# Patient Record
Sex: MALE | NOT HISPANIC OR LATINO
[De-identification: names, ages, dates, MRNs, and addresses within clinical notes are randomized per-mention and may not be internally consistent; named-entity substitution may affect disease eponyms.]

---

## 2019-03-17 PROBLEM — Z00.00 ENCOUNTER FOR PREVENTIVE HEALTH EXAMINATION: Status: ACTIVE | Noted: 2019-03-17

## 2019-04-18 ENCOUNTER — APPOINTMENT (OUTPATIENT)
Dept: CARDIOLOGY | Facility: CLINIC | Age: 74
End: 2019-04-18
Payer: MEDICARE

## 2019-04-18 VITALS — SYSTOLIC BLOOD PRESSURE: 160 MMHG | DIASTOLIC BLOOD PRESSURE: 80 MMHG

## 2019-04-18 VITALS
HEIGHT: 67 IN | WEIGHT: 159 LBS | BODY MASS INDEX: 24.96 KG/M2 | HEART RATE: 55 BPM | RESPIRATION RATE: 16 BRPM | OXYGEN SATURATION: 98 %

## 2019-04-18 VITALS — SYSTOLIC BLOOD PRESSURE: 148 MMHG | DIASTOLIC BLOOD PRESSURE: 82 MMHG

## 2019-04-18 DIAGNOSIS — Z82.49 FAMILY HISTORY OF ISCHEMIC HEART DISEASE AND OTHER DISEASES OF THE CIRCULATORY SYSTEM: ICD-10-CM

## 2019-04-18 DIAGNOSIS — Z87.891 PERSONAL HISTORY OF NICOTINE DEPENDENCE: ICD-10-CM

## 2019-04-18 DIAGNOSIS — Z80.6 FAMILY HISTORY OF LEUKEMIA: ICD-10-CM

## 2019-04-18 DIAGNOSIS — Z83.3 FAMILY HISTORY OF DIABETES MELLITUS: ICD-10-CM

## 2019-04-18 PROCEDURE — 99204 OFFICE O/P NEW MOD 45 MIN: CPT

## 2019-04-18 PROCEDURE — 93000 ELECTROCARDIOGRAM COMPLETE: CPT

## 2019-04-19 PROBLEM — Z83.3 FAMILY HISTORY OF DIABETES MELLITUS: Status: ACTIVE | Noted: 2019-04-19

## 2019-04-19 PROBLEM — Z87.891 FORMER SMOKER: Status: ACTIVE | Noted: 2019-04-19

## 2019-04-19 PROBLEM — Z80.6 FAMILY HISTORY OF LEUKEMIA: Status: ACTIVE | Noted: 2019-04-19

## 2019-04-19 PROBLEM — Z82.49 FAMILY HISTORY OF HYPERTENSION: Status: ACTIVE | Noted: 2019-04-19

## 2019-04-19 NOTE — ASSESSMENT
[FreeTextEntry1] : The findings noted in his EKG are a normal variant.  They're not abnormal.  Blood pressure is uncontrolled.  Diabetes control could be better.  Hemoglobin A1c is about 7.0.  He has nonobstructive coronary disease.  It is quite possible that this disease has progress over the last 16 years.  He also has history of hyperlipidemia.

## 2019-04-19 NOTE — PHYSICAL EXAM
[General Appearance - Well Developed] : well developed [Normal Appearance] : normal appearance [General Appearance - Well Nourished] : well nourished [Well Groomed] : well groomed [No Deformities] : no deformities [General Appearance - In No Acute Distress] : no acute distress [Normal Conjunctiva] : the conjunctiva exhibited no abnormalities [Normal Oral Mucosa] : normal oral mucosa [Eyelids - No Xanthelasma] : the eyelids demonstrated no xanthelasmas [No Oral Pallor] : no oral pallor [No Oral Cyanosis] : no oral cyanosis [Normal Jugular Venous V Waves Present] : normal jugular venous V waves present [Normal Jugular Venous A Waves Present] : normal jugular venous A waves present [No Jugular Venous Strickland A Waves] : no jugular venous strickland A waves [Heart Rate And Rhythm] : heart rate and rhythm were normal [Heart Sounds] : normal S1 and S2 [Murmurs] : no murmurs present [Respiration, Rhythm And Depth] : normal respiratory rhythm and effort [Exaggerated Use Of Accessory Muscles For Inspiration] : no accessory muscle use [Auscultation Breath Sounds / Voice Sounds] : lungs were clear to auscultation bilaterally [Abdomen Tenderness] : non-tender [Abdomen Soft] : soft [Abdomen Mass (___ Cm)] : no abdominal mass palpated [Abnormal Walk] : normal gait [Gait - Sufficient For Exercise Testing] : the gait was sufficient for exercise testing [Nail Clubbing] : no clubbing of the fingernails [Cyanosis, Localized] : no localized cyanosis [Petechial Hemorrhages (___cm)] : no petechial hemorrhages [Skin Color & Pigmentation] : normal skin color and pigmentation [] : no rash [No Venous Stasis] : no venous stasis [Skin Lesions] : no skin lesions [No Skin Ulcers] : no skin ulcer [No Xanthoma] : no  xanthoma was observed [Oriented To Time, Place, And Person] : oriented to person, place, and time [Affect] : the affect was normal [Mood] : the mood was normal [No Anxiety] : not feeling anxious

## 2019-04-19 NOTE — HISTORY OF PRESENT ILLNESS
[FreeTextEntry1] : 73 year old man of [place] descent from [facility] was referred by Dr. Mars, because of abnormal EKG.\par \par He denied any chest pain, palpitation, shortness of breath or dizziness.  He claimed that his blood pressure and blood sugar are under control.\par \par He works out regularly in the gym without any problems.  Occasionally he gets sticking chest pain lasting seconds.  It is unrelated to physical exertion.  There are no associated symptoms.\par \par I had seen him in 2003.  At that time he had cardiac catheterization because of abnormal stress test.  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  Medical therapy was recommended.  Since then he has not seen any cardiologist.

## 2019-04-19 NOTE — DISCUSSION/SUMMARY
[FreeTextEntry1] : Owing to his risk factors of hypertension, diabetes and hyperlipidemia and also because he had documented nonobstructive coronary disease 16 years ago, I would recommend further ischemia evaluation.  He has not been following regularly with cardiologist.  His lipid profile and A1c should be repeated. Based upon the results further recommendations will follow. \par \par  I also ordered echocardiogram to evaluate left ventricular size and systolic function and to rule out any regional wall motion abnormalities. Knowledge regarding the above would be extremely helpful in his continued treatment.\par \par I recommended changing simvastatin dose to 40 mg at bedtime and when it is finished he should be switched over to atorvastatin 20 mg at bedtime and titrated to get LDL cholesterol below 70.  I also increased lisinopril to 10 mg daily to get better control of blood pressure.  To verify the accuracy of his digital machine, I suggested that he bring it to the doctor's office and get its accuracy verified.\par \par

## 2019-05-09 ENCOUNTER — APPOINTMENT (OUTPATIENT)
Dept: CARDIOLOGY | Facility: CLINIC | Age: 74
End: 2019-05-09
Payer: MEDICARE

## 2019-05-09 PROCEDURE — 93016 CV STRESS TEST SUPVJ ONLY: CPT

## 2019-05-09 PROCEDURE — 93018 CV STRESS TEST I&R ONLY: CPT

## 2019-05-09 PROCEDURE — 78452 HT MUSCLE IMAGE SPECT MULT: CPT | Mod: 26

## 2019-05-23 ENCOUNTER — APPOINTMENT (OUTPATIENT)
Dept: CARDIOLOGY | Facility: CLINIC | Age: 74
End: 2019-05-23
Payer: MEDICARE

## 2019-05-23 VITALS — HEART RATE: 57 BPM | HEIGHT: 67 IN | WEIGHT: 160 LBS | BODY MASS INDEX: 25.11 KG/M2 | OXYGEN SATURATION: 58 %

## 2019-05-23 VITALS — DIASTOLIC BLOOD PRESSURE: 80 MMHG | SYSTOLIC BLOOD PRESSURE: 140 MMHG

## 2019-05-23 DIAGNOSIS — E11.37X2 TYPE 2 DIABETES MELLITUS WITH DIABETIC MACULAR EDEMA, RESOLVED FOLLOWING TREATMENT, LEFT EYE: ICD-10-CM

## 2019-05-23 DIAGNOSIS — E78.5 HYPERLIPIDEMIA, UNSPECIFIED: ICD-10-CM

## 2019-05-23 DIAGNOSIS — I25.10 ATHEROSCLEROTIC HEART DISEASE OF NATIVE CORONARY ARTERY W/OUT ANGINA PECTORIS: ICD-10-CM

## 2019-05-23 DIAGNOSIS — I10 ESSENTIAL (PRIMARY) HYPERTENSION: ICD-10-CM

## 2019-05-23 DIAGNOSIS — R94.31 ABNORMAL ELECTROCARDIOGRAM [ECG] [EKG]: ICD-10-CM

## 2019-05-23 PROCEDURE — 99214 OFFICE O/P EST MOD 30 MIN: CPT

## 2019-05-23 RX ORDER — SIMVASTATIN 40 MG/1
40 TABLET, FILM COATED ORAL DAILY
Refills: 0 | Status: COMPLETED | COMMUNITY
Start: 1900-01-01 | End: 2019-05-23

## 2019-05-23 RX ORDER — LISINOPRIL 10 MG/1
10 TABLET ORAL DAILY
Qty: 90 | Refills: 1 | Status: ACTIVE | COMMUNITY
Start: 2019-05-23 | End: 1900-01-01

## 2019-05-23 RX ORDER — ATORVASTATIN CALCIUM 20 MG/1
20 TABLET, FILM COATED ORAL
Qty: 90 | Refills: 1 | Status: ACTIVE | COMMUNITY
Start: 2019-05-23 | End: 1900-01-01

## 2019-05-23 NOTE — ASSESSMENT
[FreeTextEntry1] : Cardiac wise he is stable.  There is no evidence of significant coronary disease.  She also echo report.  His blood pressure should be a little better.\par \par I had recommended that he increase lisinopril to 10 mg and also to increase simvastatin to 40 mg daily and to switch subsequently to atorvastatin.  He misunderstood the directions and thought that he had to do that only until the stress test.  After the stress test he went back to lisinopril 2.5 mg daily and simvastatin 20 mg daily and then got a new prescription for simvastatin.

## 2019-05-23 NOTE — PHYSICAL EXAM
[General Appearance - Well Developed] : well developed [Normal Appearance] : normal appearance [Well Groomed] : well groomed [General Appearance - Well Nourished] : well nourished [No Deformities] : no deformities [General Appearance - In No Acute Distress] : no acute distress [Normal Conjunctiva] : the conjunctiva exhibited no abnormalities [Eyelids - No Xanthelasma] : the eyelids demonstrated no xanthelasmas [Normal Oral Mucosa] : normal oral mucosa [No Oral Pallor] : no oral pallor [No Oral Cyanosis] : no oral cyanosis [Normal Jugular Venous A Waves Present] : normal jugular venous A waves present [Normal Jugular Venous V Waves Present] : normal jugular venous V waves present [No Jugular Venous Strickland A Waves] : no jugular venous strickland A waves [Respiration, Rhythm And Depth] : normal respiratory rhythm and effort [Exaggerated Use Of Accessory Muscles For Inspiration] : no accessory muscle use [Auscultation Breath Sounds / Voice Sounds] : lungs were clear to auscultation bilaterally [Heart Rate And Rhythm] : heart rate and rhythm were normal [Heart Sounds] : normal S1 and S2 [Murmurs] : no murmurs present [Abdomen Soft] : soft [Abdomen Tenderness] : non-tender [Abdomen Mass (___ Cm)] : no abdominal mass palpated [Abnormal Walk] : normal gait [Gait - Sufficient For Exercise Testing] : the gait was sufficient for exercise testing [Nail Clubbing] : no clubbing of the fingernails [Cyanosis, Localized] : no localized cyanosis [Petechial Hemorrhages (___cm)] : no petechial hemorrhages [Skin Color & Pigmentation] : normal skin color and pigmentation [] : no rash [No Venous Stasis] : no venous stasis [Skin Lesions] : no skin lesions [No Skin Ulcers] : no skin ulcer [No Xanthoma] : no  xanthoma was observed [Oriented To Time, Place, And Person] : oriented to person, place, and time [Affect] : the affect was normal [Mood] : the mood was normal [No Anxiety] : not feeling anxious

## 2019-05-23 NOTE — REASON FOR VISIT
[Follow-Up - Clinic] : a clinic follow-up of [Coronary Artery Disease] : coronary artery disease [Hyperlipidemia] : hyperlipidemia [Hypertension] : hypertension [FreeTextEntry1] : DM

## 2019-05-23 NOTE — DISCUSSION/SUMMARY
[FreeTextEntry1] : Once again I told him to increase lisinopril to 10 mg once a day to get better and consistent control of blood pressure and also for renal protection.  I told him to increase simvastatin to 40 mg daily and when finished to switch over to atorvastatin 20 mg at bedtime.  I will follow up in 6 weeks.  I told him to get his blood pressure cuff and manometer to verify accuracy.

## 2019-05-23 NOTE — HISTORY OF PRESENT ILLNESS
[FreeTextEntry1] : He denies any cardiac complaints.  As per his digital manometer last night his blood pressure was 118/76.  The accuracy of his machine has not been verified so far.\par \par As part of evaluation he had a stress test done.  It was normal.  There was no evidence of ischemia or scar.  Ejection fraction was 61%.

## 2019-07-18 ENCOUNTER — APPOINTMENT (OUTPATIENT)
Dept: CARDIOLOGY | Facility: CLINIC | Age: 74
End: 2019-07-18

## 2019-08-07 ENCOUNTER — OTHER (OUTPATIENT)
Age: 74
End: 2019-08-07

## 2021-10-06 PROBLEM — I10 ESSENTIAL HYPERTENSION: Status: ACTIVE | Noted: 2019-04-18
